# Patient Record
Sex: FEMALE | Race: ASIAN
[De-identification: names, ages, dates, MRNs, and addresses within clinical notes are randomized per-mention and may not be internally consistent; named-entity substitution may affect disease eponyms.]

---

## 2018-04-18 ENCOUNTER — HOSPITAL ENCOUNTER (OUTPATIENT)
Dept: HOSPITAL 76 - DI | Age: 53
Discharge: HOME | End: 2018-04-18
Attending: NURSE PRACTITIONER
Payer: MEDICAID

## 2018-04-18 DIAGNOSIS — N85.8: Primary | ICD-10-CM

## 2018-04-18 PROCEDURE — 76856 US EXAM PELVIC COMPLETE: CPT

## 2018-04-18 PROCEDURE — 76830 TRANSVAGINAL US NON-OB: CPT

## 2018-04-18 NOTE — ULTRASOUND REPORT
PELVIC ULTRASOUND:  04/18/2018

 

CLINICAL INDICATION:  Abnormal uterine bleeding.

 

TECHNIQUE:  Transabdominal pelvic ultrasound performed for global evaluation.  Transvaginal 

pelvic ultrasound performed for detailed evaluation.  Real-time scanning performed and static 

images obtained.

 

FINDINGS:  The uterus is anteverted, measuring 6.6 x 4.3 x 3.5 cm.  The endometrium is 

atrophic, measuring 2 mm.  No focal myometrial lesion is seen.  The right ovary is 

unremarkable, measuring 2.3 x 1.3 x 1.2 cm.  The left ovary was not confidently identified on 

transabdominal or transvaginal imaging.  No left adnexal mass is seen.  No free fluid is 

present.

 

IMPRESSION:  ATROPHIC ENDOMETRIUM.

 

 

DD: 04/18/2018 14:22

TD: 04/18/2018 14:41

Job #: [id number]

## 2018-04-24 ENCOUNTER — HOSPITAL ENCOUNTER (OUTPATIENT)
Dept: HOSPITAL 76 - DI | Age: 53
Discharge: HOME | End: 2018-04-24
Attending: NURSE PRACTITIONER
Payer: MEDICAID

## 2018-04-24 DIAGNOSIS — Z12.31: Primary | ICD-10-CM

## 2018-04-24 PROCEDURE — 77067 SCR MAMMO BI INCL CAD: CPT

## 2018-04-25 NOTE — MAMMOGRAPHY REPORT
SCREENING DIGITAL MAMMOGRAM:  04/24/2018.

 

CLINICAL INDICATION:  A 52-year-old with history of benign biopsy for new 
baseline.

 

FINDINGS:  The location of the patient's previous mammograms is unknown.

 

The breasts demonstrate heterogeneously dense fibroglandular parenchyma 
bilaterally.  A few 

coarse, typically benign calcifications are present.  A biopsy marker is noted 
in the right 

outer central breast.  No suspicious masses, clustered microcalcifications, or 
regions of 

architectural distortion are identified.

 

IMPRESSION:  BENIGN FINDINGS.

 

RECOMMENDATION:  Routine annual screening unless otherwise clinically indicated.

 

BIRADS CATEGORY 2 - BENIGN FINDINGS.

 

STANDARD QUALIFYING STATEMENTS

1. This examination was reviewed with the aid of Computed Aided Detection (CAD).

2. A negative x-ray report should not delay biopsy if a dominant or clinically 
suspicious mass is present. More than 5% of cancers are not identified by x-ray.

3. Dense breasts may obscure an underlying neoplasm.



 

DD: 04/25/2018 11:40

TD: 04/25/2018 14:24

Job #: [id number]

RAI